# Patient Record
Sex: FEMALE | Race: WHITE | NOT HISPANIC OR LATINO | Employment: FULL TIME | URBAN - METROPOLITAN AREA
[De-identification: names, ages, dates, MRNs, and addresses within clinical notes are randomized per-mention and may not be internally consistent; named-entity substitution may affect disease eponyms.]

---

## 2020-10-06 ENCOUNTER — COMMUNICATION - HEALTHEAST (OUTPATIENT)
Dept: SCHEDULING | Facility: CLINIC | Age: 20
End: 2020-10-06

## 2022-02-06 ENCOUNTER — APPOINTMENT (OUTPATIENT)
Dept: ULTRASOUND IMAGING | Facility: HOSPITAL | Age: 22
End: 2022-02-06
Attending: EMERGENCY MEDICINE
Payer: COMMERCIAL

## 2022-02-06 ENCOUNTER — HOSPITAL ENCOUNTER (EMERGENCY)
Facility: HOSPITAL | Age: 22
Discharge: HOME | End: 2022-02-06
Attending: EMERGENCY MEDICINE | Admitting: EMERGENCY MEDICINE
Payer: COMMERCIAL

## 2022-02-06 VITALS
SYSTOLIC BLOOD PRESSURE: 138 MMHG | RESPIRATION RATE: 14 BRPM | TEMPERATURE: 98 F | OXYGEN SATURATION: 100 % | HEART RATE: 103 BPM | BODY MASS INDEX: 20.98 KG/M2 | WEIGHT: 130 LBS | DIASTOLIC BLOOD PRESSURE: 83 MMHG

## 2022-02-06 DIAGNOSIS — O20.0 THREATENED MISCARRIAGE: ICD-10-CM

## 2022-02-06 DIAGNOSIS — N76.0 BACTERIAL VAGINOSIS: ICD-10-CM

## 2022-02-06 DIAGNOSIS — B96.89 BACTERIAL VAGINOSIS: ICD-10-CM

## 2022-02-06 LAB
BASOPHILS # BLD AUTO: 0.1 10E3/UL (ref 0–0.2)
BASOPHILS NFR BLD AUTO: 1 %
CLUE CELLS: PRESENT
EOSINOPHIL # BLD AUTO: 0.1 10E3/UL (ref 0–0.7)
EOSINOPHIL NFR BLD AUTO: 2 %
ERYTHROCYTE [DISTWIDTH] IN BLOOD BY AUTOMATED COUNT: 12.6 % (ref 10–15)
HCG SERPL QL: POSITIVE
HCG SERPL-ACNC: 11 MLU/ML (ref 0–4)
HCT VFR BLD AUTO: 36.5 % (ref 35–47)
HGB BLD-MCNC: 12.2 G/DL (ref 11.7–15.7)
IMM GRANULOCYTES # BLD: 0 10E3/UL
IMM GRANULOCYTES NFR BLD: 0 %
LYMPHOCYTES # BLD AUTO: 1.9 10E3/UL (ref 0.8–5.3)
LYMPHOCYTES NFR BLD AUTO: 29 %
MCH RBC QN AUTO: 30.4 PG (ref 26.5–33)
MCHC RBC AUTO-ENTMCNC: 33.4 G/DL (ref 31.5–36.5)
MCV RBC AUTO: 91 FL (ref 78–100)
MONOCYTES # BLD AUTO: 0.5 10E3/UL (ref 0–1.3)
MONOCYTES NFR BLD AUTO: 8 %
NEUTROPHILS # BLD AUTO: 4.1 10E3/UL (ref 1.6–8.3)
NEUTROPHILS NFR BLD AUTO: 60 %
NRBC # BLD AUTO: 0 10E3/UL
NRBC BLD AUTO-RTO: 0 /100
PLATELET # BLD AUTO: 230 10E3/UL (ref 150–450)
RBC # BLD AUTO: 4.01 10E6/UL (ref 3.8–5.2)
TRICHOMONAS, WET PREP: ABNORMAL
WBC # BLD AUTO: 6.6 10E3/UL (ref 4–11)
WBC'S/HIGH POWER FIELD, WET PREP: ABNORMAL
YEAST, WET PREP: ABNORMAL

## 2022-02-06 PROCEDURE — 84702 CHORIONIC GONADOTROPIN TEST: CPT | Performed by: EMERGENCY MEDICINE

## 2022-02-06 PROCEDURE — 87491 CHLMYD TRACH DNA AMP PROBE: CPT | Performed by: EMERGENCY MEDICINE

## 2022-02-06 PROCEDURE — 85025 COMPLETE CBC W/AUTO DIFF WBC: CPT | Performed by: EMERGENCY MEDICINE

## 2022-02-06 PROCEDURE — 76830 TRANSVAGINAL US NON-OB: CPT

## 2022-02-06 PROCEDURE — 36415 COLL VENOUS BLD VENIPUNCTURE: CPT | Performed by: EMERGENCY MEDICINE

## 2022-02-06 PROCEDURE — 84703 CHORIONIC GONADOTROPIN ASSAY: CPT | Performed by: EMERGENCY MEDICINE

## 2022-02-06 PROCEDURE — 87591 N.GONORRHOEAE DNA AMP PROB: CPT | Performed by: EMERGENCY MEDICINE

## 2022-02-06 PROCEDURE — 99285 EMERGENCY DEPT VISIT HI MDM: CPT | Mod: 25

## 2022-02-06 PROCEDURE — 87210 SMEAR WET MOUNT SALINE/INK: CPT | Performed by: EMERGENCY MEDICINE

## 2022-02-06 RX ORDER — METRONIDAZOLE 7.5 MG/G
1 GEL VAGINAL DAILY
Qty: 25 G | Refills: 0 | Status: SHIPPED | OUTPATIENT
Start: 2022-02-06 | End: 2022-02-11

## 2022-02-06 NOTE — ED NOTES
Pt presents NAD. SKIN: WNL.   HEENT: Normocephalic, PERRL, alert and oriented x 3.   CHEST: Symmetrical rise, breath sounds are equal and clear bilaterally. No reported CP or SOB.  ABD: Patient states vaginal bleeding on and off x past month. Hx , states cycle has not been the same since procedure. States intermittent nausea. Endorses ariadna and rectal pain. Pain with bowel movements.   EXT: JONES x 4

## 2022-02-06 NOTE — ED PROVIDER NOTES
EMERGENCY DEPARTMENT ENCOUNTER            IMPRESSION:  Threatened miscarriage  Bacterial vaginosis      MEDICAL DECISION MAKING:  Patient evaluated for pelvic pain and vaginal bleeding.  She had a medical  2021 just over 2 months ago.    On exam she does not appear uncomfortable.  Her pulse is slightly elevated.  There is no abdominal tenderness.  She has a small amount of blood in the vaginal vault.  The cervix is open and bleeding.    Qualitative hCG is positive however her quant is only 11.    Wet prep shows presence of BV.    Pelvic ultrasound shows dominant follicle but no evidence of intrauterine pregnancy    CBC is normal    Patient likely has complete spontaneous .  Early pregnancy cannot be excluded at this time but is unlikely.    Patient to be discharged home with recommended outpatient follow-up for repeat hCG    Metronidazole prescribed for BV      =================================================================  CHIEF COMPLAINT:  Chief Complaint   Patient presents with     Vaginal Bleeding         HPI  Nan Quintero is a 21 year old female with a history of  (21) who presents to the ED by walk in for evaluation of vaginal bleeding.    The patient reports intermittent irregular vaginal bleeding for the past month. She initially thought the vaginal bleeding was her menstrual period, but she has had episodes of vaginal bleeding 4-5 times in the past month. The episodes of bleeding start as a heavy flow decreasing to a light flow after a few days, and then no bleeding for a couple days before the heavy flow begins again. She has been having irregular periods since she had a medical  in November. She endorses associated lower left sided abdominal pain. The most recent episode of vaginal bleeding began last night. She denies any urinary symptoms. She is not currently on any form of birth control.    Roshan AGUERO am serving as a scribe to document services  personally performed by Dr. Jorge A Hanna MD, based on my observation and the provider's statements to me. I, Dr. Jorge A Hanna MD attest that Roshan Jauregui is acting in a scribe capacity, has observed my performance of the services and has documented them in accordance with my direction.      REVIEW OF SYSTEMS   Constitutional: Denies fever, chills, unintentional weight loss or fatigue   Eyes: Denies visual changes or discharge    HENT: Denies sore throat, ear pain or neck pain  Respiratory: Denies cough or shortness of breath    Cardiovascular: Denies chest pain, palpitations or leg swelling  GI: Denies nausea, vomiting, or dark, bloody stools. Endorses abdominal pain (lower left)  : Denies hematuria, dysuria, or flank pain. Endorses vaginal bleeding.  Musculoskeletal: Denies any new back pain or new muscle/joint pains  Skin: Denies rash or wound  Neurologic: Denies current headache, new weakness, focal weakness, or sensory changes    Lymphatic: Denies swollen glands    Psychiatric: Denies depression, suicidal ideation or homicidal ideation.    Remainder of systems reviewed, unless noted in HPI all others negative.      PAST MEDICAL HISTORY:  History reviewed. No pertinent past medical history.    PAST SURGICAL HISTORY:  History reviewed. No pertinent surgical history.      CURRENT MEDICATIONS:    metroNIDAZOLE (METROGEL) 0.75 % vaginal gel  copper (PARAGARD) 380 square mm IUD IUD  MEDICATION CANNOT BE REORDERED - PLEASE MANUALLY REORDER AND DISCONTINUE THE OLD ORDER  metoclopramide (REGLAN) 10 MG tablet  XULANE 150-35 mcg/24 hr        ALLERGIES:  No Known Allergies    FAMILY HISTORY:  History reviewed. No pertinent family history.    SOCIAL HISTORY:   Social History     Socioeconomic History     Marital status: Single     Spouse name: Not on file     Number of children: Not on file     Years of education: Not on file     Highest education level: Not on file   Occupational History     Not on file   Tobacco Use      Smoking status: Light Tobacco Smoker     Types: Cigarettes, Cigarettes     Smokeless tobacco: Never Used   Substance and Sexual Activity     Alcohol use: Not on file     Drug use: Not on file     Sexual activity: Not on file   Other Topics Concern     Not on file   Social History Narrative    The patient was brought into the ED via patient's father.     Social Determinants of Health     Financial Resource Strain: Not on file   Food Insecurity: Not on file   Transportation Needs: Not on file   Physical Activity: Not on file   Stress: Not on file   Social Connections: Not on file   Intimate Partner Violence: Not on file   Housing Stability: Not on file       PHYSICAL EXAM:    /83   Pulse 103   Temp 98  F (36.7  C) (Oral)   Resp 14   Wt 59 kg (130 lb)   SpO2 100%   BMI 20.98 kg/m      Constitutional: Awake, alert, in no acute distress  Head: Normocephalic, atraumatic.  ENT: Mucous membranes moist. Posterior oropharynx appears normal.  Eyes: Pupils midrange and reactive ,no conjunctival discharge  Neck: No lymphadenopathy, no stridor, supple, soft tissue swelling  Chest: No tenderness   Respiratory: Respirations even, unlabored. Lungs clear to ascultation bilaterally, in no acute respiratory distress.  Cardiovascular: Regular rate and rhythm.+2 radial pulses, equal bilaterally.  No murmurs.   GI: Abdomen soft, non-tender to palpation in all 4 quadrants. No guarding or rebound. Bowel sounds intact on all 4 quadrants.  Pelvic exam reveals a small amount of vaginal bleeding.  The cervix is open.  There is no adnexal or suprapubic tenderness  Back: No CVA tenderness.    Musculoskeletal: Moves all 4 extremities equally, strength symmetrical on bilateral uppers and lowers.  No peripheral edema  Integument: Warm, dry. No rash. No bruising or petechiae.  Lymphatic: No cervical lymphadenopathy  Neurologic: Alert & oriented x 3. Normal speech. Grossly normal motor and sensory function. No focal deficits noted.     Psychiatric: Normal mood and affect. Normal judgement.    ED COURSE:    9:58 AM I introduced myself to the patient, performed my physical exam, and discussed plan for ED workup.  12:26 PM Rechecked and updated the patient. We discussed the plan for discharge and the patient is agreeable. Reviewed supportive cares, symptomatic treatment, outpatient follow up, and reasons to return to the Emergency Department. Patient to be discharged by ED RN.    LAB:  All pertinent labs reviewed and interpreted.  Results for orders placed or performed during the hospital encounter of 02/06/22   US Pelvic Complete w Transvaginal    Impression    IMPRESSION:  1.  There is a dominant follicle in the left ovary measures 2.2 x 3.8 cm. This may be explain the patient's pain. No free fluid. The should BE considered physiologic. No additional follow-up needed.         HCG qualitative Blood   Result Value Ref Range    hCG Serum Qualitative Positive (A) Negative   CBC with platelets and differential   Result Value Ref Range    WBC Count 6.6 4.0 - 11.0 10e3/uL    RBC Count 4.01 3.80 - 5.20 10e6/uL    Hemoglobin 12.2 11.7 - 15.7 g/dL    Hematocrit 36.5 35.0 - 47.0 %    MCV 91 78 - 100 fL    MCH 30.4 26.5 - 33.0 pg    MCHC 33.4 31.5 - 36.5 g/dL    RDW 12.6 10.0 - 15.0 %    Platelet Count 230 150 - 450 10e3/uL    % Neutrophils 60 %    % Lymphocytes 29 %    % Monocytes 8 %    % Eosinophils 2 %    % Basophils 1 %    % Immature Granulocytes 0 %    NRBCs per 100 WBC 0 <1 /100    Absolute Neutrophils 4.1 1.6 - 8.3 10e3/uL    Absolute Lymphocytes 1.9 0.8 - 5.3 10e3/uL    Absolute Monocytes 0.5 0.0 - 1.3 10e3/uL    Absolute Eosinophils 0.1 0.0 - 0.7 10e3/uL    Absolute Basophils 0.1 0.0 - 0.2 10e3/uL    Absolute Immature Granulocytes 0.0 <=0.4 10e3/uL    Absolute NRBCs 0.0 10e3/uL   HCG quantitative pregnancy (blood)   Result Value Ref Range    hCG Quantitative 11 (H) 0 - 4 mlU/mL   Wet prep    Specimen: Vagina; Swab   Result Value Ref Range     Trichomonas Absent Absent    Yeast Absent Absent    Clue Cells Present (A) Absent    WBCs/high power field None None       RADIOLOGY:  Reviewed all pertinent imaging. Please see official radiology report.  US Pelvic Complete w Transvaginal   Final Result   IMPRESSION:   1.  There is a dominant follicle in the left ovary measures 2.2 x 3.8 cm. This may be explain the patient's pain. No free fluid. The should BE considered physiologic. No additional follow-up needed.                    MEDICATIONS GIVEN IN THE EMERGENCY:  Medications - No data to display        NEW PRESCRIPTIONS STARTED AT TODAY'S ER VISIT:  New Prescriptions    METRONIDAZOLE (METROGEL) 0.75 % VAGINAL GEL    Place 1 applicator (5 g) vaginally daily for 5 days          FINAL DIAGNOSIS:    ICD-10-CM    1. Threatened miscarriage  O20.0    2. Bacterial vaginosis  N76.0     B96.89             At the conclusion of the encounter I discussed the results of all of the tests and the disposition. The questions were answered. The patient or family acknowledged understanding and was agreeable with the care plan.     NAME: Nan Quintero  AGE: 21 year old female  YOB: 2000  MRN: [id number]  EVALUATION DATE & TIME: No admission date for patient encounter.    PCP: Corazon Lucero    ED PROVIDER: KIM Goode Austin Carroll, am serving as a scribe to document services personally performed by Dr. Jorge A Hanna based on my observation and the provider's statements to me. Jorge A AGUERO MD attest that Roshan Jauregui is acting in a scribe capacity, has observed my performance of the services and has documented them in accordance with my direction.    Jorge A Hanna M.D.  Emergency Medicine  [facility] EMERGENCY DEPARTMENT  [facility] [facility] [address]  [phone]  Dept: [phone]  2/6/2022       Jorge A Hanna MD  02/06/22 7913

## 2022-02-06 NOTE — DISCHARGE INSTRUCTIONS
The ultrasound shows a small left-sided ovarian cyst.  Your pregnancy test is positive but at a very low level.  Your pain and bleeding may be from a miscarriage or very early pregnancy.  You need to follow-up within 1 to 2 days to have your hCG repeated.  You also have bacterial vaginosis.  You have been prescribed a cream to treat this infection.

## 2022-02-06 NOTE — ED TRIAGE NOTES
Patient presents here for prolonged vaginal bleeding that has occurred over the past month. She notes episodes of heavy bleeding to light flow for a few days, then ceases for about 4-5 days, of which the heavy bleeding resumes. She also notes that she experiences pain to her perineal and rectal areas as well. She has not been able to get an appointment with her OBGYN. She does have episodes of dizziness. She is sexually active and does not use any form of birth control.

## 2022-02-07 LAB
C TRACH DNA SPEC QL NAA+PROBE: NEGATIVE
N GONORRHOEA DNA SPEC QL NAA+PROBE: NEGATIVE

## 2024-12-21 ENCOUNTER — APPOINTMENT (OUTPATIENT)
Dept: RADIOLOGY | Facility: HOSPITAL | Age: 24
End: 2024-12-21
Payer: COMMERCIAL

## 2024-12-21 ENCOUNTER — HOSPITAL ENCOUNTER (EMERGENCY)
Facility: HOSPITAL | Age: 24
Discharge: HOME | End: 2024-12-21
Payer: COMMERCIAL

## 2024-12-21 VITALS
HEART RATE: 89 BPM | OXYGEN SATURATION: 97 % | DIASTOLIC BLOOD PRESSURE: 68 MMHG | BODY MASS INDEX: 20.09 KG/M2 | RESPIRATION RATE: 22 BRPM | TEMPERATURE: 98.6 F | WEIGHT: 125 LBS | SYSTOLIC BLOOD PRESSURE: 105 MMHG | HEIGHT: 66 IN

## 2024-12-21 DIAGNOSIS — J20.9 ACUTE BRONCHITIS: ICD-10-CM

## 2024-12-21 LAB
ANION GAP SERPL CALCULATED.3IONS-SCNC: 12 MMOL/L (ref 7–15)
BASOPHILS # BLD AUTO: 0 10E3/UL (ref 0–0.2)
BASOPHILS NFR BLD AUTO: 0 %
BUN SERPL-MCNC: 12 MG/DL (ref 6–20)
CALCIUM SERPL-MCNC: 10.2 MG/DL (ref 8.8–10.4)
CHLORIDE SERPL-SCNC: 102 MMOL/L (ref 98–107)
CREAT SERPL-MCNC: 0.76 MG/DL (ref 0.51–0.95)
D DIMER PPP FEU-MCNC: 0.37 UG/ML FEU (ref 0–0.5)
EGFRCR SERPLBLD CKD-EPI 2021: >90 ML/MIN/1.73M2
EOSINOPHIL # BLD AUTO: 0.1 10E3/UL (ref 0–0.7)
EOSINOPHIL NFR BLD AUTO: 1 %
ERYTHROCYTE [DISTWIDTH] IN BLOOD BY AUTOMATED COUNT: 12.9 % (ref 10–15)
FLUAV RNA SPEC QL NAA+PROBE: NEGATIVE
FLUBV RNA RESP QL NAA+PROBE: NEGATIVE
GLUCOSE SERPL-MCNC: 91 MG/DL (ref 70–99)
HCG UR QL: NEGATIVE
HCO3 SERPL-SCNC: 25 MMOL/L (ref 22–29)
HCT VFR BLD AUTO: 37.9 % (ref 35–47)
HGB BLD-MCNC: 13 G/DL (ref 11.7–15.7)
HOLD SPECIMEN: NORMAL
IMM GRANULOCYTES # BLD: 0 10E3/UL
IMM GRANULOCYTES NFR BLD: 0 %
LYMPHOCYTES # BLD AUTO: 1.1 10E3/UL (ref 0.8–5.3)
LYMPHOCYTES NFR BLD AUTO: 16 %
MCH RBC QN AUTO: 30.1 PG (ref 26.5–33)
MCHC RBC AUTO-ENTMCNC: 34.3 G/DL (ref 31.5–36.5)
MCV RBC AUTO: 88 FL (ref 78–100)
MONOCYTES # BLD AUTO: 0.6 10E3/UL (ref 0–1.3)
MONOCYTES NFR BLD AUTO: 8 %
NEUTROPHILS # BLD AUTO: 5.2 10E3/UL (ref 1.6–8.3)
NEUTROPHILS NFR BLD AUTO: 74 %
NRBC # BLD AUTO: 0 10E3/UL
NRBC BLD AUTO-RTO: 0 /100
PLATELET # BLD AUTO: 172 10E3/UL (ref 150–450)
POTASSIUM SERPL-SCNC: 3.9 MMOL/L (ref 3.4–5.3)
RBC # BLD AUTO: 4.32 10E6/UL (ref 3.8–5.2)
RSV RNA SPEC NAA+PROBE: NEGATIVE
S PYO DNA THROAT QL NAA+PROBE: NOT DETECTED
SARS-COV-2 RNA RESP QL NAA+PROBE: NEGATIVE
SODIUM SERPL-SCNC: 139 MMOL/L (ref 135–145)
TROPONIN T SERPL HS-MCNC: <6 NG/L
WBC # BLD AUTO: 7.1 10E3/UL (ref 4–11)

## 2024-12-21 PROCEDURE — 80048 BASIC METABOLIC PNL TOTAL CA: CPT

## 2024-12-21 PROCEDURE — 99285 EMERGENCY DEPT VISIT HI MDM: CPT | Mod: 25

## 2024-12-21 PROCEDURE — 87637 SARSCOV2&INF A&B&RSV AMP PRB: CPT | Performed by: STUDENT IN AN ORGANIZED HEALTH CARE EDUCATION/TRAINING PROGRAM

## 2024-12-21 PROCEDURE — 84484 ASSAY OF TROPONIN QUANT: CPT

## 2024-12-21 PROCEDURE — 258N000003 HC RX IP 258 OP 636

## 2024-12-21 PROCEDURE — 87651 STREP A DNA AMP PROBE: CPT

## 2024-12-21 PROCEDURE — 250N000013 HC RX MED GY IP 250 OP 250 PS 637

## 2024-12-21 PROCEDURE — 85379 FIBRIN DEGRADATION QUANT: CPT

## 2024-12-21 PROCEDURE — 85004 AUTOMATED DIFF WBC COUNT: CPT

## 2024-12-21 PROCEDURE — 96360 HYDRATION IV INFUSION INIT: CPT

## 2024-12-21 PROCEDURE — 36415 COLL VENOUS BLD VENIPUNCTURE: CPT

## 2024-12-21 PROCEDURE — 81025 URINE PREGNANCY TEST: CPT

## 2024-12-21 PROCEDURE — 93005 ELECTROCARDIOGRAM TRACING: CPT

## 2024-12-21 PROCEDURE — 71046 X-RAY EXAM CHEST 2 VIEWS: CPT

## 2024-12-21 RX ORDER — BENZONATATE 100 MG/1
100 CAPSULE ORAL 3 TIMES DAILY PRN
Qty: 21 CAPSULE | Refills: 0 | Status: SHIPPED | OUTPATIENT
Start: 2024-12-21

## 2024-12-21 RX ORDER — IBUPROFEN 600 MG/1
600 TABLET, FILM COATED ORAL ONCE
Status: COMPLETED | OUTPATIENT
Start: 2024-12-21 | End: 2024-12-21

## 2024-12-21 RX ORDER — ACETAMINOPHEN 325 MG/1
650 TABLET ORAL ONCE
Status: COMPLETED | OUTPATIENT
Start: 2024-12-21 | End: 2024-12-21

## 2024-12-21 RX ORDER — ALBUTEROL SULFATE 90 UG/1
2 INHALANT RESPIRATORY (INHALATION) EVERY 6 HOURS PRN
Qty: 18 G | Refills: 0 | Status: SHIPPED | OUTPATIENT
Start: 2024-12-21

## 2024-12-21 RX ADMIN — SODIUM CHLORIDE 500 ML: 9 INJECTION, SOLUTION INTRAVENOUS at 14:12

## 2024-12-21 RX ADMIN — ACETAMINOPHEN 650 MG: 325 TABLET ORAL at 13:56

## 2024-12-21 RX ADMIN — IBUPROFEN 600 MG: 600 TABLET, FILM COATED ORAL at 13:56

## 2024-12-21 ASSESSMENT — ACTIVITIES OF DAILY LIVING (ADL)
ADLS_ACUITY_SCORE: 41

## 2024-12-21 ASSESSMENT — COLUMBIA-SUICIDE SEVERITY RATING SCALE - C-SSRS
1. IN THE PAST MONTH, HAVE YOU WISHED YOU WERE DEAD OR WISHED YOU COULD GO TO SLEEP AND NOT WAKE UP?: NO
6. HAVE YOU EVER DONE ANYTHING, STARTED TO DO ANYTHING, OR PREPARED TO DO ANYTHING TO END YOUR LIFE?: NO
2. HAVE YOU ACTUALLY HAD ANY THOUGHTS OF KILLING YOURSELF IN THE PAST MONTH?: NO

## 2024-12-21 NOTE — DISCHARGE INSTRUCTIONS
You are seen in the emergency department for evaluation of cough and chest pain.  Thankfully no sign of blood clot in the lungs, heart attack, pneumonia, popped lungs, fluid buildup in the lungs.  You do not have COVID, flu, RSV.  Did not have strep throat.  Your blood counts and kidney function all look great.  You have bronchitis.  This is a viral infection.  It can linger for a few weeks.  I recommend that you stop vaping.     Use Tylenol and ibuprofen as below to help with your symptoms.  I prescribed you an albuterol inhaler to help with the shortness of breath and cough.  I also prescribed you benzonatate to help with the cough.  This medication cannot be given to children as they can be very dangerous.  You can also use honey as we discussed as well.     You may take 600 mg of ibuprofen (Advil) every 6 hours and 650 mg acetaminophen (Tylenol) every 6 hours for pain. Do not take more than 3200 mg of ibuprofen (Advil) in 24 hours. Do not take more than 3000 mg of acetaminophen (Tylenol) in 24 hours. You may take this much for 1-3 days, then only use as needed.     Schedule an appointment for an emergency department follow-up with your primary care provider.    Return to the emergency department for new/worsening chest pain, shortness of breath, fever, difficulty breathing, or any other concerning symptoms.

## 2024-12-21 NOTE — ED PROVIDER NOTES
Emergency Department Encounter   NAME: Nan Quintero  AGE: 24 year old female  YOB: 2000  MRN: [id number]    PCP: Corazon Lucero  ED PROVIDER: Alma Hernandez PA-C    Evaluation Date & Time:   12/21/2024  1:42 PM    CHIEF COMPLAINT:  Cough      Impression and Plan   MDM: 24-year-old female with no pertinent history presents for evaluation of cough and left-sided chest pain.  Cough started 3 or 4 days ago.  At this time she also developed some left-sided chest pain that is worse with inspiration.  No fever.  No congestion.  No history of PE or DVT.  No estrogen use or recent immobilization.  Mild sore throat.  On arrival here patient is tachycardic at 138.  Mildly tachypneic at 22.  No hypoxia.  Afebrile.  On my exam patient is in no acute distress.  She is breathing comfortably.  Unremarkable cardiac exam.  Pulmonary exam without any abnormalities.  Mild posterior oropharyngeal erythema.  Uvula is midline.    No wheezing or pulmonary findings concerning for reactive airway disease possible viral illness, URI, pneumonia, strep throat, ACS, PE.  Patient is not PERC negative but is low risk.  Lower suspicion given cough and that her brother has a cough and she also has a mild sore throat.  The patient is quite tachycardic and is describing pleuritic chest pain.  We discussed moving forward with D-dimer which patient is agreeable to.  We reviewed plan for fluids, labs, imaging, Tylenol and ibuprofen which patient is agreeable to.    EKG showing sinus tachycardia.  No evidence of ischemia.  Troponin is under 6, ACS is unlikely.  Lab work without any leukocytosis concerning for ongoing systemic infection or inflammatory response.  No anemia.  No concerning electrolyte abnormalities or VI.  D-dimer is negative, PE is unlikely.  Negative pregnancy test.  Negative strep swab.  Negative COVID, flu, RSV swab.  Chest x-ray per my independent interpretation without any consolidation concerning for pneumonia,  pneumothorax, pleural effusion.  Negative chest per radiology read.    I reassessed the patient.  Her tachycardia has resolved with fluids, Tylenol, ibuprofen.  Will maintain and dynamically stable.  No hypoxia.  Breathing comfortably.  We discussed all lab and imaging results.  Overall very reassuring workup.  Discussed likely bronchitis.  Reviewed symptomatic care at home with Tylenol, ibuprofen, plenty of fluids.  I prescribed her an albuterol inhaler as well as some Tessalon to help her symptoms.  We reviewed use and side effects of these medications.  Discussed trying honey for her cough and sore throat as well.  She is going to follow-up with her primary care provider.  We reviewed strict return precautions, signs of respiratory distress, patient was discharged home in stable condition    ED Course as of 12/21/24 1948   Sat Dec 21, 2024   1446 D-Dimer Quantitative: 0.37  Negative. PE unlikely. CXR ordered   1539 Pulse: 99  Improved        Medical Decision Making  Obtained supplemental history:Supplemental history obtained?: No  Reviewed external records: External records reviewed?: No  Care impacted by chronic illness:Documented in Chart  Did you consider but not order tests?: Work up considered but not performed and documented in chart, if applicable  Did you interpret images independently?: Independent interpretation of ECG and images noted in documentation, when applicable.  Consultation discussion with other provider:Did you involve another provider (consultant, MH, pharmacy, etc.)?: No  Discharge. I prescribed additional prescription strength medication(s) as charted. N/A.    MIPS: Not Applicable        FINAL IMPRESSION:    ICD-10-CM    1. Acute bronchitis  J20.9             MEDICATIONS GIVEN IN THE EMERGENCY DEPARTMENT:  Medications   acetaminophen (TYLENOL) tablet 650 mg (650 mg Oral $Given 12/21/24 1356)   ibuprofen (ADVIL/MOTRIN) tablet 600 mg (600 mg Oral $Given 12/21/24 1356)   sodium chloride 0.9%  "BOLUS 500 mL (0 mLs Intravenous Stopped 12/21/24 1514)         NEW PRESCRIPTIONS STARTED AT TODAY'S ED VISIT:  Discharge Medication List as of 12/21/2024  4:31 PM        START taking these medications    Details   albuterol (PROAIR HFA/PROVENTIL HFA/VENTOLIN HFA) 108 (90 Base) MCG/ACT inhaler Inhale 2 puffs into the lungs every 6 hours as needed for shortness of breath, wheezing or cough., Disp-18 g, R-0, E-PrescribePharmacy may dispense brand covered by insurance (Proair, or proventil or ventolin or generic albuterol inhaler)      benzonatate (TESSALON) 100 MG capsule Take 1 capsule (100 mg) by mouth 3 times daily as needed for cough., Disp-21 capsule, R-0, E-Prescribe               HPI   Patient information was obtained from: patient   Use of Intrepreter: N/A     Nan Quintero is a 24 year old female with a pertinent history of anxiety, depression, and alcohol use disorder who presents to the ED by car for evaluation of chest pain and cough.  Patient states that she has a worsening cough for the last 3 to 4 days.  Intermittently is productive of some clear/yellow sputum and intermittently has blood-streaked.  She reports that over the last 2 days she has developed left lower chest pain.  This is worse with inspiration.  She endorses shortness of breath only right after coughing.  No palpitations.  No fever.  Her brother has a cough as well.  Mild sore throat.  No estrogen use, recent immobilization, history of PE or DVT.      REVIEW OF SYSTEMS:  Pertinent positive and negative symptoms per HPI.       Physical Exam     First Vitals:  Patient Vitals for the past 24 hrs:   BP Temp Temp src Pulse Resp SpO2 Height Weight   12/21/24 1625 105/68 -- -- 89 -- 97 % -- --   12/21/24 1510 -- -- -- 99 -- 95 % -- --   12/21/24 1500 -- -- -- 109 -- 97 % -- --   12/21/24 1314 133/86 98.6  F (37  C) Oral (!) 130 22 99 % 1.676 m (5' 6\") 56.7 kg (125 lb)       PHYSICAL EXAM:   General Appearance:  Alert, cooperative, no distress, " appears stated age  HENT: Normocephalic without obvious deformity, atraumatic. Mucous membranes moist.  Bilateral tonsils are mildly enlarged and erythematous.  No tonsillar asymmetry or exudate.  Uvula is midline.  No cervical lymphadenopathy.  Eyes: Conjunctiva clear, Lids normal. No discharge.   Respiratory: No distress. Lungs clear to ausculation bilaterally. No wheezes, rhonchi or stridor  Cardiovascular: Tachycardic with regular rhythm, no murmur. Normal cap refill. No peripheral edema  GI: Abdomen soft, nontender, normal bowel sounds  Musculoskeletal: Moving all extremities. No gross deformities  Integument: Warm, dry, no rashes or lesions  Neurologic: Alert and orientated x3.   Psych: Normal mood and affect      Results     LAB:  All pertinent labs reviewed and interpreted  Labs Ordered and Resulted from Time of ED Arrival to Time of ED Departure   INFLUENZA A/B, RSV AND SARS-COV2 PCR - Normal       Result Value    Influenza A PCR Negative      Influenza B PCR Negative      RSV PCR Negative      SARS CoV2 PCR Negative     HCG QUALITATIVE URINE - Normal    hCG Urine Qualitative Negative     D DIMER QUANTITATIVE - Normal    D-Dimer Quantitative 0.37     BASIC METABOLIC PANEL - Normal    Sodium 139      Potassium 3.9      Chloride 102      Carbon Dioxide (CO2) 25      Anion Gap 12      Urea Nitrogen 12.0      Creatinine 0.76      GFR Estimate >90      Calcium 10.2      Glucose 91     TROPONIN T, HIGH SENSITIVITY - Normal    Troponin T, High Sensitivity <6     GROUP A STREPTOCOCCUS PCR THROAT SWAB - Normal    Group A strep by PCR Not Detected     CBC WITH PLATELETS AND DIFFERENTIAL    WBC Count 7.1      RBC Count 4.32      Hemoglobin 13.0      Hematocrit 37.9      MCV 88      MCH 30.1      MCHC 34.3      RDW 12.9      Platelet Count 172      % Neutrophils 74      % Lymphocytes 16      % Monocytes 8      % Eosinophils 1      % Basophils 0      % Immature Granulocytes 0      NRBCs per 100 WBC 0      Absolute  Neutrophils 5.2      Absolute Lymphocytes 1.1      Absolute Monocytes 0.6      Absolute Eosinophils 0.1      Absolute Basophils 0.0      Absolute Immature Granulocytes 0.0      Absolute NRBCs 0.0         RADIOLOGY:  Chest XR,  PA & LAT   Final Result   IMPRESSION: Negative chest.              ECG:    Performed at: 1424     Impression: sinus tachycardia    Rate: 105  Rhythm: sinus tachycardia  MA Interval: 146  QRS Interval: 94  QTc Interval: 430  ST Changes: none  Comparison: no previous    EKG results reviewed and interpreted by Dr. Holliday, ED MD.         Alma Hernandez PA-C   Emergency Medicine   [facility] EMERGENCY DEPARTMENT       Alma Hernandez PA-C  12/21/24 1948

## 2024-12-21 NOTE — ED TRIAGE NOTES
Patient presents here with a cough that has gotten progressively worse over the past 3-4 days. She also notes pain to the lower left ribcage boarder. Cough is harsh and occasionally productive of sputum.      Triage Assessment (Adult)       Row Name 12/21/24 1316          Triage Assessment    Airway WDL WDL        Respiratory WDL    Respiratory WDL WDL        Skin Circulation/Temperature WDL    Skin Circulation/Temperature WDL WDL        Cardiac WDL    Cardiac WDL WDL        Peripheral/Neurovascular WDL    Peripheral Neurovascular WDL WDL        Cognitive/Neuro/Behavioral WDL    Cognitive/Neuro/Behavioral WDL WDL

## 2024-12-22 LAB
ATRIAL RATE - MUSE: 105 BPM
DIASTOLIC BLOOD PRESSURE - MUSE: NORMAL MMHG
INTERPRETATION ECG - MUSE: NORMAL
P AXIS - MUSE: 75 DEGREES
PR INTERVAL - MUSE: 146 MS
QRS DURATION - MUSE: 94 MS
QT - MUSE: 326 MS
QTC - MUSE: 430 MS
R AXIS - MUSE: 58 DEGREES
SYSTOLIC BLOOD PRESSURE - MUSE: NORMAL MMHG
T AXIS - MUSE: 49 DEGREES
VENTRICULAR RATE- MUSE: 105 BPM

## 2025-01-08 ENCOUNTER — HOSPITAL ENCOUNTER (EMERGENCY)
Facility: HOSPITAL | Age: 25
Discharge: HOME | End: 2025-01-08
Attending: STUDENT IN AN ORGANIZED HEALTH CARE EDUCATION/TRAINING PROGRAM | Admitting: STUDENT IN AN ORGANIZED HEALTH CARE EDUCATION/TRAINING PROGRAM
Payer: COMMERCIAL

## 2025-01-08 VITALS
DIASTOLIC BLOOD PRESSURE: 82 MMHG | HEART RATE: 63 BPM | WEIGHT: 121 LBS | HEIGHT: 66 IN | OXYGEN SATURATION: 98 % | TEMPERATURE: 98.2 F | BODY MASS INDEX: 19.44 KG/M2 | SYSTOLIC BLOOD PRESSURE: 119 MMHG | RESPIRATION RATE: 18 BRPM

## 2025-01-08 DIAGNOSIS — M62.838 MUSCLE SPASM: ICD-10-CM

## 2025-01-08 DIAGNOSIS — J18.9 PNEUMONIA OF LEFT LOWER LOBE DUE TO INFECTIOUS ORGANISM: ICD-10-CM

## 2025-01-08 PROCEDURE — 99283 EMERGENCY DEPT VISIT LOW MDM: CPT | Mod: 25

## 2025-01-08 PROCEDURE — 250N000013 HC RX MED GY IP 250 OP 250 PS 637: Performed by: STUDENT IN AN ORGANIZED HEALTH CARE EDUCATION/TRAINING PROGRAM

## 2025-01-08 RX ORDER — AMOXICILLIN 500 MG/1
1000 CAPSULE ORAL 3 TIMES DAILY
Qty: 42 CAPSULE | Refills: 0 | Status: SHIPPED | OUTPATIENT
Start: 2025-01-08 | End: 2025-01-15

## 2025-01-08 RX ORDER — AMOXICILLIN 250 MG/1
1000 CAPSULE ORAL ONCE
Status: COMPLETED | OUTPATIENT
Start: 2025-01-08 | End: 2025-01-08

## 2025-01-08 RX ORDER — CYCLOBENZAPRINE HCL 10 MG
10 TABLET ORAL 3 TIMES DAILY PRN
Qty: 9 TABLET | Refills: 0 | Status: SHIPPED | OUTPATIENT
Start: 2025-01-08 | End: 2025-01-11

## 2025-01-08 RX ORDER — CYCLOBENZAPRINE HCL 10 MG
10 TABLET ORAL ONCE
Status: COMPLETED | OUTPATIENT
Start: 2025-01-08 | End: 2025-01-08

## 2025-01-08 RX ADMIN — AMOXICILLIN 1000 MG: 250 CAPSULE ORAL at 01:24

## 2025-01-08 RX ADMIN — CYCLOBENZAPRINE 10 MG: 10 TABLET, FILM COATED ORAL at 01:24

## 2025-01-08 ASSESSMENT — COLUMBIA-SUICIDE SEVERITY RATING SCALE - C-SSRS
6. HAVE YOU EVER DONE ANYTHING, STARTED TO DO ANYTHING, OR PREPARED TO DO ANYTHING TO END YOUR LIFE?: NO
2. HAVE YOU ACTUALLY HAD ANY THOUGHTS OF KILLING YOURSELF IN THE PAST MONTH?: NO
1. IN THE PAST MONTH, HAVE YOU WISHED YOU WERE DEAD OR WISHED YOU COULD GO TO SLEEP AND NOT WAKE UP?: NO

## 2025-01-08 NOTE — ED NOTES
Pt reviewed and understands discharge instructions. No further questions at this time. Prescriptions provided.

## 2025-01-08 NOTE — ED TRIAGE NOTES
Pt here d/t right rib pain that sometimes goes up into chest. States she had bronchitis since evelina. Pain worse with movement before, but now Pt describes muscle spasms now with coughing, yawning, and intermittently without movement.      Triage Assessment (Adult)       Row Name 01/08/25 0009          Triage Assessment    Airway WDL WDL        Respiratory WDL    Respiratory WDL WDL        Skin Circulation/Temperature WDL    Skin Circulation/Temperature WDL WDL        Cognitive/Neuro/Behavioral WDL    Cognitive/Neuro/Behavioral WDL WDL

## 2025-01-08 NOTE — ED PROVIDER NOTES
EMERGENCY DEPARTMENT ENCOUNTER      NAME: Nan Quintero  AGE: 24 year old female  YOB: 2000  MRN: [id number]  EVALUATION DATE & TIME: No admission date for patient encounter.    PCP: Corazon Lucero    ED PROVIDER: Bienvenido Brasher MD      Chief Complaint   Patient presents with    Rib Pain     right         FINAL IMPRESSION:  1. Pneumonia of left lower lobe due to infectious organism    2. Muscle spasm          ED COURSE & MEDICAL DECISION MAKING:    Pertinent Labs & Imaging studies reviewed. (See chart for details)  24 year old female presents to the Emergency Department for evaluation of rib pain. LEFT    ED Course as of 01/08/25 0110   Wed Jan 08, 2025   0108 Patient is a 24-year-old female who presents the emergency department with approximately 2 weeks of cough, and now developing left lower chest wall pain.  She has been treated with inhaler, cough suppressant for bronchitis.  Exam is overall reassuring without evidence of respiratory distress.  Chest x-ray was obtained, which showed left lower lobe pneumonia, and suspect that her pain is partially due to this as well as muscle spasm from approximately 2 weeks of coughing.  Will treat the patient with amoxicillin 1000 mg in addition to 10 mg of Flexeril in the ER, and prescriptions for these medications at home.  Return precautions provided, discharged.       Medical Decision Making  Obtained supplemental history:Supplemental history obtained?: No  Reviewed external records: External records reviewed?: Outpatient Record: 12/21/2024 [facility] ED visit  Care impacted by chronic illness:Documented in Chart  Care significantly affected by social determinants of health:N/A  Did you consider but not order tests?: Work up considered but not performed and documented in chart, if applicable  Did you interpret images independently?: Independent interpretation of ECG and images noted in documentation, when  applicable.  Consultation discussion with other provider:Did you involve another provider (consultant, , pharmacy, etc.)?: No  Discharge. I prescribed additional prescription strength medication(s) as charted. See documentation for any additional details.  Not Applicable        At the conclusion of the encounter I discussed the results of all of the tests and the disposition. The questions were answered. The patient or family acknowledged understanding and was agreeable with the care plan.     0 minutes of critical care time     MEDICATIONS GIVEN IN THE EMERGENCY:  Medications   amoxicillin (AMOXIL) capsule 1,000 mg (has no administration in time range)   cyclobenzaprine (FLEXERIL) tablet 10 mg (has no administration in time range)       NEW PRESCRIPTIONS STARTED AT TODAY'S ER VISIT  New Prescriptions    AMOXICILLIN (AMOXIL) 500 MG CAPSULE    Take 2 capsules (1,000 mg) by mouth 3 times daily for 7 days.    CYCLOBENZAPRINE (FLEXERIL) 10 MG TABLET    Take 1 tablet (10 mg) by mouth 3 times daily as needed for muscle spasms.          =================================================================    HPI    Patient information was obtained from: Patient    Use of : N/A         Nan Quintero is a 24 year old female with a pertinent history of alcohol use disorder, cannabis dependence, depression, and PTSD who presents to this ED by private car for evaluation of rib pain.    Per chart review:   12/21/2024 Patient was seen at [facility] for evaluation of cough and left-sided chest pain. Negative strep and viral swabs. Negative chest XR. Diagnoses with acute bronchitis and discharged in stable conditions with albuterol and benzonatate.    Patient reports 2 days of constant left-sided rib pain. Pain was manageable at first, but tonight it progressively worsened. Before, pain worsened with exertion and improved with sitting, laying, and resting. Now tonight, when patient was trying  to go to bed the pain was worsening when lying down. Patient tried tylenol and Pepto-Bismal without any relief. Last dose of tylenol was 30 minutes prior to arrival. No fevers reported.     Patient has been sick with bronchitis since 12/25. Patient still endorses a cough, but it is improving at this time. Cough is no longer productive. Patient was prescribed tessalon, albuterol inhaler, and tylenol for symptoms.     No other symptoms, complaints, or concerns at this time.      PAST MEDICAL HISTORY:  No past medical history on file.    PAST SURGICAL HISTORY:  No past surgical history on file.        CURRENT MEDICATIONS:    amoxicillin (AMOXIL) 500 MG capsule  cyclobenzaprine (FLEXERIL) 10 MG tablet  albuterol (PROAIR HFA/PROVENTIL HFA/VENTOLIN HFA) 108 (90 Base) MCG/ACT inhaler  benzonatate (TESSALON) 100 MG capsule  copper (PARAGARD) 380 square mm IUD IUD  MEDICATION CANNOT BE REORDERED - PLEASE MANUALLY REORDER AND DISCONTINUE THE OLD ORDER  metoclopramide (REGLAN) 10 MG tablet  XULANE 150-35 mcg/24 hr        ALLERGIES:  No Known Allergies    FAMILY HISTORY:  No family history on file.    SOCIAL HISTORY:   Social History     Socioeconomic History    Marital status:    Tobacco Use    Smoking status: Light Smoker     Types: Cigarettes    Smokeless tobacco: Never   Social History Narrative    The patient was brought into the ED via patient's father.     Social Drivers of Health     Financial Resource Strain: At Risk (5/31/2023)    Received from [facility]    Financial Resource Strain     Is it hard for you to pay for the very basics like food, housing, medical care or heating?: Yes   Food Insecurity: At Risk (5/31/2023)    Received from [facility]    Food Insecurity     Does your food run out before you have the money to buy more?: Yes   Transportation Needs: Not At Risk (5/31/2023)    Received from [facility]    Transportation Needs     Does a lack of transportation keep you from your medical  "appointments or from getting your medications?: No       VITALS:  BP (!) 137/95   Pulse 112   Temp 98.2  F (36.8  C) (Oral)   Resp 22   Ht 1.676 m (5' 6\")   Wt 54.9 kg (121 lb)   SpO2 99%   BMI 19.53 kg/m      PHYSICAL EXAM    Physical Exam  Vitals and nursing note reviewed.   Constitutional:       General: She is not in acute distress.     Appearance: Normal appearance. She is normal weight. She is not ill-appearing.   HENT:      Head: Normocephalic and atraumatic.   Eyes:      Conjunctiva/sclera: Conjunctivae normal.   Cardiovascular:      Rate and Rhythm: Regular rhythm. Tachycardia present.      Pulses: Normal pulses.   Pulmonary:      Effort: Pulmonary effort is normal. No respiratory distress.      Breath sounds: Normal breath sounds.   Abdominal:      General: Abdomen is flat.   Musculoskeletal:         General: Normal range of motion.      Cervical back: Normal range of motion.      Right lower leg: No edema.      Left lower leg: No edema.   Skin:     General: Skin is warm and dry.   Neurological:      Mental Status: She is alert and oriented to person, place, and time. Mental status is at baseline.   Psychiatric:         Mood and Affect: Mood normal.         Behavior: Behavior normal.         Thought Content: Thought content normal.         Judgment: Judgment normal.            LAB:  All pertinent labs reviewed and interpreted.  Results for orders placed or performed during the hospital encounter of 01/08/25   Chest XR,  PA & LAT    Impression    IMPRESSION: Cardiomediastinal silhouette within normal limits. Left basilar infiltrate. Right lung clear. No pleural effusion.       RADIOLOGY:  Reviewed all pertinent imaging. Please see official radiology report.  Chest XR,  PA & LAT   Preliminary Result   IMPRESSION: Cardiomediastinal silhouette within normal limits. Left basilar infiltrate. Right lung clear. No pleural effusion.          PROCEDURES:   None      [facility] [employer] [facility] Documentation: "   CMS Diagnoses:              I, Laura Che, am serving as a scribe to document services personally performed by Bienvenido Brasher MD based on my observation and the provider's statements to me. I, Bienvenido Brasher MD, attest that Laura Che is acting in a scribe capacity, has observed my performance of the services and has documented them in accordance with my direction.    Bienvenido Brasher MD  [facility] EMERGENCY DEPARTMENT  [address] [place] [id number] [phone]       Bienvenido Brasher MD  01/08/25 0110

## 2025-01-08 NOTE — DISCHARGE INSTRUCTIONS
Continue to treat your discomfort with 1000 mg Tylenol every 6 hours, 600 mg ibuprofen every 6 hours, and if this is not effective then you may use the prescribed muscle relaxant.  He will start to feel improvement after being on antibiotics for 48 hours as well to help treat the pneumonia in your left lower lung.    Please return to the ER if symptoms worsen, if you develop fever despite antibiotics, difficulty breathing.

## 2025-01-08 NOTE — Clinical Note
Nan Leon was seen and treated in our emergency department on 1/8/2025.  She may return to work on 01/10/2025.       If you have any questions or concerns, please don't hesitate to call.      Bienvenido Brasher MD
speech and difficulty walking
speech and difficulty walking